# Patient Record
Sex: FEMALE | Race: WHITE | Employment: OTHER | URBAN - METROPOLITAN AREA
[De-identification: names, ages, dates, MRNs, and addresses within clinical notes are randomized per-mention and may not be internally consistent; named-entity substitution may affect disease eponyms.]

---

## 2018-02-13 ENCOUNTER — HOSPITAL ENCOUNTER (OUTPATIENT)
Dept: CV DIAGNOSTICS | Facility: HOSPITAL | Age: 73
Discharge: HOME | End: 2018-02-13
Attending: INTERNAL MEDICINE
Payer: MEDICARE

## 2018-02-13 DIAGNOSIS — R07.9 CHEST PAIN, UNSPECIFIED TYPE: ICD-10-CM

## 2018-02-13 PROCEDURE — 93350 STRESS TTE ONLY: CPT | Performed by: INTERNAL MEDICINE

## 2018-02-13 PROCEDURE — 93017 CV STRESS TEST TRACING ONLY: CPT | Performed by: INTERNAL MEDICINE

## 2018-02-13 PROCEDURE — 93018 CV STRESS TEST I&R ONLY: CPT | Performed by: INTERNAL MEDICINE

## 2018-02-13 PROCEDURE — 93016 CV STRESS TEST SUPVJ ONLY: CPT | Performed by: INTERNAL MEDICINE

## 2018-08-20 ENCOUNTER — HOSPITAL ENCOUNTER (OUTPATIENT)
Dept: RESPIRATORY THERAPY | Facility: HOSPITAL | Age: 73
Discharge: HOME | End: 2018-08-20
Attending: INTERNAL MEDICINE
Payer: MEDICARE

## 2018-08-20 DIAGNOSIS — J06.9 UPPER RESPIRATORY INFECTION: ICD-10-CM

## 2018-08-20 DIAGNOSIS — R06.2 WHEEZING: ICD-10-CM

## 2018-08-20 DIAGNOSIS — R07.9 CHEST PAIN: ICD-10-CM

## 2018-08-20 PROCEDURE — 94729 DIFFUSING CAPACITY: CPT

## 2018-08-20 PROCEDURE — 94060 EVALUATION OF WHEEZING: CPT

## 2018-08-20 PROCEDURE — 94726 PLETHYSMOGRAPHY LUNG VOLUMES: CPT

## 2018-08-20 NOTE — PROCEDURES
Pulmonary Function Test Results     Impression: The flow volume loop is consistent with mild obstructive ventilatory defect. Spirometry is normal. There is no bronchodilator response.  There is air trapping and hyperinflation (RV is 175% of predicted and

## 2020-10-23 PROBLEM — Z80.0 FAMILY HISTORY OF MALIGNANT NEOPLASM OF GASTROINTESTINAL TRACT: Status: ACTIVE | Noted: 2020-10-23

## 2020-10-23 PROBLEM — J45.20 MILD INTERMITTENT ASTHMA WITHOUT COMPLICATION: Status: ACTIVE | Noted: 2019-01-29

## 2020-10-23 PROBLEM — Z91.09 ENVIRONMENTAL ALLERGIES: Status: ACTIVE | Noted: 2020-10-23

## 2020-10-23 PROBLEM — M81.8 OTHER OSTEOPOROSIS WITHOUT CURRENT PATHOLOGICAL FRACTURE: Status: ACTIVE | Noted: 2017-01-09

## 2020-10-23 PROBLEM — J45.30 MILD PERSISTENT ASTHMA WITHOUT COMPLICATION: Status: ACTIVE | Noted: 2019-01-29

## 2020-10-23 PROBLEM — R32 URINARY INCONTINENCE: Status: ACTIVE | Noted: 2020-10-23

## 2021-02-03 PROBLEM — E55.9 VITAMIN D DEFICIENCY: Status: ACTIVE | Noted: 2021-02-03

## 2021-03-03 PROBLEM — J01.91 ACUTE RECURRENT SINUSITIS, UNSPECIFIED LOCATION: Status: ACTIVE | Noted: 2021-03-03

## 2021-07-01 PROCEDURE — 88311 DECALCIFY TISSUE: CPT | Performed by: OTOLARYNGOLOGY

## 2021-07-01 PROCEDURE — 88304 TISSUE EXAM BY PATHOLOGIST: CPT | Performed by: OTOLARYNGOLOGY

## 2021-09-20 PROBLEM — I70.0 AORTIC CALCIFICATION (HCC): Status: ACTIVE | Noted: 2021-09-20

## 2021-09-20 PROBLEM — J44.9 CHRONIC OBSTRUCTIVE ASTHMA (HCC): Status: ACTIVE | Noted: 2021-09-20

## 2022-01-24 PROBLEM — J45.30 MILD PERSISTENT ASTHMA WITHOUT COMPLICATION: Status: RESOLVED | Noted: 2019-01-29 | Resolved: 2022-01-24

## 2022-01-24 PROBLEM — J01.91 ACUTE RECURRENT SINUSITIS, UNSPECIFIED LOCATION: Status: RESOLVED | Noted: 2021-03-03 | Resolved: 2022-01-24
